# Patient Record
Sex: FEMALE | Race: WHITE
[De-identification: names, ages, dates, MRNs, and addresses within clinical notes are randomized per-mention and may not be internally consistent; named-entity substitution may affect disease eponyms.]

---

## 2017-01-03 ENCOUNTER — HOSPITAL ENCOUNTER (OUTPATIENT)
Dept: HOSPITAL 10 - SDS | Age: 37
Discharge: HOME | End: 2017-01-03
Attending: INTERNAL MEDICINE
Payer: COMMERCIAL

## 2017-01-03 VITALS — DIASTOLIC BLOOD PRESSURE: 68 MMHG | RESPIRATION RATE: 18 BRPM | HEART RATE: 69 BPM | SYSTOLIC BLOOD PRESSURE: 108 MMHG

## 2017-01-03 VITALS — SYSTOLIC BLOOD PRESSURE: 124 MMHG | HEART RATE: 88 BPM | DIASTOLIC BLOOD PRESSURE: 72 MMHG | RESPIRATION RATE: 16 BRPM

## 2017-01-03 VITALS
BODY MASS INDEX: 29.49 KG/M2 | BODY MASS INDEX: 29.49 KG/M2 | WEIGHT: 177.03 LBS | WEIGHT: 177.03 LBS | HEIGHT: 65 IN | HEIGHT: 65 IN

## 2017-01-03 VITALS — HEART RATE: 76 BPM | DIASTOLIC BLOOD PRESSURE: 75 MMHG | SYSTOLIC BLOOD PRESSURE: 117 MMHG | RESPIRATION RATE: 18 BRPM

## 2017-01-03 DIAGNOSIS — K29.50: Primary | ICD-10-CM

## 2017-01-03 PROCEDURE — 88305 TISSUE EXAM BY PATHOLOGIST: CPT

## 2017-01-03 PROCEDURE — 84703 CHORIONIC GONADOTROPIN ASSAY: CPT

## 2017-01-03 PROCEDURE — 88312 SPECIAL STAINS GROUP 1: CPT

## 2017-01-03 PROCEDURE — 43239 EGD BIOPSY SINGLE/MULTIPLE: CPT

## 2017-01-03 NOTE — GILP
DATE OF PROCEDURE:  

 

 

PROCEDURE PERFORMED:  EGD with biopsy.

 

INDICATION:  A 36-year-old female undergoing this procedure for persistent abdominal pain and heartb
urn.  The purpose is to evaluate upper GI tract and find out the cause of her symptoms.

 

INFORMED CONSENT:  The risk of the procedure, related and unrelated complications, anesthetic risks,
 alternatives discussed and informed consent was obtained.

 

DESCRIPTION OF PROCEDURE:  The patient was brought to the GI lab, sedated with 7 mg of Versed and 10
0 mg of fentanyl.  After optimal sedation, scope was passed with much ease into esophagus, which was
 grossly within normal limits.  Z line was at 37 cm, which was regular.  Stomach mucosa revealed mil
d gastritis.  Multiple biopsies obtained.  Duodenum, first and second part including ampulla, appear
ed normal.  Villi also were normal.  Retroversion done in the stomach.  No gross lesion was identifi
ed.  Scope was straightened out and removed with good patient tolerance.

 

IMPRESSION:

1.  Gastritis.

2.  Normal esophagus.

3.  Z line at 37 cm, which was normal.

4.  Normal duodenum.

 

PLAN:  Review histopathology.  We need to find out some other cause for her abdominal pain.

 

 

Dictated By: LESLIE ALVARADO/NERY

DD:    01/03/2017 11:11:01

DT:    01/03/2017 17:11:32

Conf#: [id number]

DID#:  [id number]

CC: LESLIE PEREZ MD;*EndCC*

## 2017-03-09 ENCOUNTER — HOSPITAL ENCOUNTER (EMERGENCY)
Dept: HOSPITAL 10 - FTE | Age: 37
Discharge: HOME | End: 2017-03-09
Payer: COMMERCIAL

## 2017-03-09 VITALS
RESPIRATION RATE: 18 BRPM | SYSTOLIC BLOOD PRESSURE: 112 MMHG | TEMPERATURE: 98.4 F | DIASTOLIC BLOOD PRESSURE: 62 MMHG | HEART RATE: 75 BPM

## 2017-03-09 VITALS — WEIGHT: 169.76 LBS | BODY MASS INDEX: 39.29 KG/M2 | HEIGHT: 55 IN

## 2017-03-09 DIAGNOSIS — R51: Primary | ICD-10-CM

## 2017-03-09 DIAGNOSIS — R11.0: ICD-10-CM

## 2017-03-09 LAB — URINE BLOOD (DIP) POC: (no result)

## 2017-03-09 PROCEDURE — 96372 THER/PROPH/DIAG INJ SC/IM: CPT

## 2017-03-09 PROCEDURE — 96374 THER/PROPH/DIAG INJ IV PUSH: CPT

## 2017-03-09 PROCEDURE — 81003 URINALYSIS AUTO W/O SCOPE: CPT

## 2017-03-09 PROCEDURE — 96375 TX/PRO/DX INJ NEW DRUG ADDON: CPT

## 2017-03-09 NOTE — ERD
ER Documentation


Chief Complaint


Date/Time


DATE: 3/9/17 


TIME: 09:58


Chief Complaint


HEADACHE X5 DAYS, HX OF MIGRAINE





HPI


This is a 37-year-old female who presents to the emergency department today 

complaining of a headache for the past several days.  Patient states she has a 

history of migraines.  States that she is to take amitriptyline from her doctor 

but changed over to Excedrin because amitriptyline was not working.  States she 

took Excedrin last night with no improvement.  Patient states she has some 

light sensitivity, noise sensitivity and some nausea.  Denies any fevers or 

chills, blurred vision





ROS


All systems reviewed and are negative except as per history of present illness.





Medications


Home Meds


Active Scripts


Naproxen* (Naprosyn*) 500 Mg Tablet, 500 MG PO BID Y for PAIN AND/OR 

INFLAMMATION, #30 TAB


   Prov:ZIA DOMINGUEZ PA-C         3/9/17


Acetamin/Butalbital/Caffeine* (Fioricet*) [id number] Tab, 1 TAB PO Q6H Y 

for PAIN, #30 TAB


   Prov:ZIA DOMINGUEZ PA-C         3/9/17


Reported Medications


Omeprazole* (Omeprazole*) 40 Mg Capsule.dr, 40 MG PO DAILY, #30 CAP


   1/3/17





Allergies


Allergies:  


Coded Allergies:  


     Penicillins (Verified  Allergy, Mild, 3/9/17)


     amoxicillin (Verified  Allergy, Mild, 3/9/17)





PMhx/Soc


History of Surgery:  Yes (breast implants and btl)


Anesthesia Reaction:  Yes (nausea and  vomiting)


Hx Neurological Disorder:  Yes (migrane )


Hx Respiratory Disorders:  No


Hx Cardiac Disorders:  No


Hx Psychiatric Problems:  No


Hx Miscellaneous Medical Probl:  No


Hx Alcohol Use:  Yes (socially)


Hx Substance Use:  No


Hx Tobacco Use:  No


Smoking Status:  Never smoker





Physical Exam


Vitals





Vital Signs








  Date Time  Temp Pulse Resp B/P Pulse Ox O2 Delivery O2 Flow Rate FiO2


 


3/9/17 08:30 97.8 67 18 138/81 100   








Physical Exam


Const:     Talkative, no acute distress


Head:   Atraumatic 


Eyes:    Normal Conjunctiva.  PERRLA.  EOM intact.


ENT:    Normal External Ears, Nose and Mouth.


Neck:               Full range of motion..~ No meningismus.


Resp:    Clear to auscultation bilaterally


Cardio:    Regular rate and rhythm, no murmurs


Abd:    Soft, non tender, non distended. Normal bowel sounds


Skin:    No petechiae or rashes


Neur:    Awake and alert.  Cranial nerves II through XII intact.  No gait 

ataxia.


Psych:    Normal Mood and Affect


Results 24 hrs





 Laboratory Tests








Test


  3/9/17


09:48


 


Bedside Urine Blood 3+ 


 


Bedside Urine Glucose (UA) Negative 


 


Bedside Urine Ketones (LAB) Negative 


 


Bedside Urine Leukocyte


Esterase (L Negative 


 


 


Bedside Urine Nitrite (LAB) Negative 


 


Bedside Urine Protein (LAB) Negative 


 


Bedside Urine pH (LAB) 6.0 








 Current Medications








 Medications


  (Trade)  Dose


 Ordered  Sig/Dameon


 Route


 PRN Reason  Start Time


 Stop Time Status Last Admin


Dose Admin


 


 Ketorolac


 Tromethamine


  (Toradol)  30 mg  ONCE  STAT


 IM


   3/9/17 09:33


 3/9/17 09:35 DC 3/9/17 09:45


 


 


 Ondansetron HCl


  (Zofran Odt)  4 mg  ONCE  STAT


 ODT


   3/9/17 09:33


 3/9/17 09:35 DC 3/9/17 09:45


 


 


 Diphenhydramine


 HCl


  (Benadryl)  12.5 mg  ONCE  ONCE


 IV


   3/9/17 10:30


 3/9/17 10:31 DC 3/9/17 10:36


 


 


 Metoclopramide


 HCl 10 mg  10 mg  ONCE  ONCE


 IV


   3/9/17 10:30


 3/9/17 10:31 DC 3/9/17 10:35


 


 


 Sodium Chloride


  (NS)  1,000 ml @ 


 1,000 mls/hr  Q1H ONCE


 IV


   3/9/17 10:30


 3/9/17 11:29 DC 3/9/17 10:35


 











Procedures/MDM


This is a 37-year-old female who presents to the emergency department today 

complaining of a headache for the past several days.  Patient has a history of 

migraines.  Given patient's history and the fact that she has no focal 

neurologic deficits and no gait ataxia I do not feel that she requires a head 

CT scan at this time.  Low suspicion for acute hemorrhage, mass, or abscess.





UA is negative for infection.  There is 3+ blood.  Patient indicated she was on 

her menstrual cycle currently.


Pregnancy test is negative.





Patient symptoms at this time is consistent with migraine headache.  Patient 

was given a Toradol injection as well as Zofran here in the emergency 

department however headache persisted.  I discussed the patient with Dr. Martínez 

he recommended giving her IV fluids, Benadryl and Reglan.  She reported feeling 

significantly better after this treatment.





Patient will be given a prescription for Naprosyn, Fioricet for home.  She has 

been instructed to follow back up with her primary care physician for possible 

referral to neurology.  I will give her a list of resources for neurology.





At this time the patient is stable for discharge and outpatient management. 

Patient should follow up with their PCP in the next 1-2 days.  They may return 

to the emergency department sooner for any persistent or worsening of symptoms.

  Patient understood and agreed with the plan.





Departure


Diagnosis:  


 Primary Impression:  


 Headache


 Headache type:  unspecified  Headache chronicity pattern:  unspecified pattern

  Intractability:  not intractable  Qualified Code:  R51 - Nonintractable 

headache, unspecified chronicity pattern, unspecified headache type


Condition:  ZIA Brewer PA-C Mar 9, 2017 10:00

## 2017-05-08 ENCOUNTER — HOSPITAL ENCOUNTER (EMERGENCY)
Dept: HOSPITAL 10 - FTE | Age: 37
Discharge: HOME | End: 2017-05-08
Payer: COMMERCIAL

## 2017-05-08 VITALS
BODY MASS INDEX: 29.69 KG/M2 | BODY MASS INDEX: 29.69 KG/M2 | WEIGHT: 167.55 LBS | WEIGHT: 167.55 LBS | HEIGHT: 63 IN | HEIGHT: 63 IN

## 2017-05-08 DIAGNOSIS — R11.10: ICD-10-CM
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
## 2017-05-08 DIAGNOSIS — J02.9: ICD-10-CM

## 2017-05-08 DIAGNOSIS — R51: ICD-10-CM

## 2017-05-08 DIAGNOSIS — S40.862A: ICD-10-CM

## 2017-05-08 DIAGNOSIS — R50.9: Primary | ICD-10-CM

## 2017-05-08 PROCEDURE — 99284 EMERGENCY DEPT VISIT MOD MDM: CPT

## 2017-05-08 NOTE — ERD
ER Documentation


Chief Complaint


Date/Time


DATE: 5/8/17 


TIME: 07:35


Chief Complaint


fever , vomiting , sore throat





HPI


This a 37-year-old female who presents the emergency department today 

complaining of sore throat,, headache vomiting, and itchiness on her skin from 

bug bites that started last night.  Patient states that she was in [place] 

[place] and went back to her home country of [facility] for the last week.  

States that she has gone to the bathroom multiple times but denies any 

diarrhea.  States she has some crampy abdominal pain.  States she felt chilled 

but denies any fevers, joint pain.  Denies any dysuria.





ROS


All systems reviewed and are negative except as per history of present illness.





Medications


Home Meds


Active Scripts


Ciprofloxacin Hcl* (Ciprofloxacin Hcl*) 500 Mg Tablet, 500 MG PO BID for 3 Days

, TAB


   Prov:ZIA DOMINGUEZ PA-C         5/8/17


Cetirizine Hcl* (Zyrtec*) 10 Mg Capsule, 10 MG PO DAILY, #10 TAB.CHEW


   Prov:ZIA DOMINGUEZ PA-C         5/8/17


Acetaminophen* (Tylophen*) 500 Mg Capsule, 1 CAP PO Q6H Y for PAIN AND OR 

ELEVATED TEMP, #30 CAP


   Prov:ZIA DOMINGUEZ PA-C         5/8/17


Ibuprofen* (Motrin*) 600 Mg Tab, 600 MG PO Q6, #30 TAB


   Prov:ZIA DOMINGUEZ PA-C         5/8/17


Electrolyte,Oral (Pedialyte) 1,000 Ml Solution, 100 ML PO Q6 Y for vomiting, #

1000 ML


   Prov:ZIA DOMINGUEZ PA-C         5/8/17


Hydrocortisone* Topical (Hydrocortisone* Topical) 1%-28.35 Gm Cream..g., 1 

APPLIC TOP Q6 Y for ITCHING, #1 TUB


   Prov:ZIA DOMINGUEZ PA-C         5/8/17


Ondansetron Hcl* (Zofran*) 4 Mg Tablet, 4 MG PO Q6H for NAUSEA AND/OR VOMITING, 

#30 TAB


   Prov:ZIA DOMINGUEZ PA-C         5/8/17


Diphenhydramine Hcl* (Benadryl*) 25 Mg Cap, 25 MG PO Q6, #30 CAP


   Prov:ZIA DOMINGUEZ PA-C         5/8/17


Naproxen* (Naprosyn*) 500 Mg Tablet, 500 MG PO BID Y for PAIN AND/OR 

INFLAMMATION, #30 TAB


   Prov:ZIA DOMINGUEZ PA-C         3/9/17


Acetamin/Butalbital/Caffeine* (Fioricet*) [id number] Tab, 1 TAB PO Q6H Y 

for PAIN, #30 TAB


   Prov:ZIA DOMINGUEZ PA-C         3/9/17


Reported Medications


Omeprazole* (Omeprazole*) 40 Mg Capsule.dr, 40 MG PO DAILY, #30 CAP


   1/3/17





Allergies


Allergies:  


Coded Allergies:  


     Penicillins (Verified  Allergy, Mild, 3/9/17)


     amoxicillin (Verified  Allergy, Mild, 3/9/17)





PMhx/Soc


History of Surgery:  Yes (breast implants and btl)


Anesthesia Reaction:  Yes (nausea and  vomiting)


Hx Neurological Disorder:  Yes (migrane )


Hx Respiratory Disorders:  No


Hx Cardiac Disorders:  No


Hx Psychiatric Problems:  No


Hx Miscellaneous Medical Probl:  No


Hx Alcohol Use:  Yes (socially)


Hx Substance Use:  No


Hx Tobacco Use:  No





Physical Exam


Vitals





Vital Signs








  Date Time  Temp Pulse Resp B/P Pulse Ox O2 Delivery O2 Flow Rate FiO2


 


5/8/17 06:57 98.1 79 16 127/77 98   








Physical Exam


Const:    Nontoxic-appearing


Head:   Atraumatic 


Eyes:    Normal Conjunctiva


ENT:    Ears TMs normal.  Nose no drainage.  Throat no erythema no exudate.  

Drainage posterior pharynx peer


Neck:               Full range of motion..~ No meningismus.


Resp:    Clear to auscultation bilaterally


Cardio:    Regular rate and rhythm, no murmurs


Abd:    Soft, crampy periumbilical tenderness non distended. Normal bowel 

sounds no right lower quadrant pain.  No tenderness McBurney's.  No left lower 

quadrant pain.


Skin:    No petechiae or rashes.  Multiple small bug bite on left arm


Neur:    Awake and alert


Psych:    Normal Mood and Affect


Results 24 hrs





 Current Medications








 Medications


  (Trade)  Dose


 Ordered  Sig/Dameon


 Route


 PRN Reason  Start Time


 Stop Time Status Last Admin


Dose Admin


 


 Ondansetron HCl


  (Zofran Odt)  4 mg  ONCE  STAT


 ODT


   5/8/17 07:22


 5/8/17 07:24 DC 5/8/17 07:26


 


 


 Acetaminophen


  (Tylenol Tab)  500 mg  ONCE  STAT


 PO


   5/8/17 07:26


 5/8/17 07:27 DC 5/8/17 07:28


 











Procedures/MDM


This a 37-year-old female who presents to the emergency department today with 

multiple complaints.  Patient stated that she turned from [facility] last 

night when she started noticing the symptoms.  On physical exam patient appears 

to have more crampy abdominal pain however she has no focal tenderness in the 

right lower quadrant and no tenderness McBurney's.  She has no left lower 

quadrant pain.  Low suspicion for acute surgical abdomen.  Patient is not 

actively vomiting however I will give her Zofran and a p.o. challenge here in 

the emergency department.





 Patient's ENT exam is benign with the exception that the patient has 

laryngitis.  I have seen this patient in the past and she did follow-up with 

her doctor in regards to her laryngitis but was never referred.  Patient does 

have some drainage in her posterior pharynx and it may be secondary to allergic 

rhinitis however I have given patient a referral information for ENT specialist 

to further evaluate the laryngitis.  Patient denies any difficulty swallowing.





Patient also has multiple insect bites that were noticed on her left arm.  

Patient is afebrile and otherwise well-appearing.  She has no rash or joint 

pain.  She has no evidence of conjunctivitis and have low suspicion that 

patient has contracted Zika virus of [place] or dengue.  





Patient was given Zofran, Tylenol and a p.o. challenge here in the emergency 

department.  Patient was not actively vomiting.  Prior to discharge patient was 

complaining of a headache.  





Patient was given a prescription for Zyrtec, Motrin, Pedialyte, Zofran, 

Tylenol.  Given that patient has recently traveled out of the country I did 

give the patient a prescription for Cipro and explained to her that she should 

only take the Cipro if she develops diarrhea.  Patient was also given a 

prescription for Benadryl and hydrocortisone cream for her insect bites.  I did 

not give patient Benadryl here in the emergency department as she was driving 

herself





At this time the patient is stable for discharge and outpatient management. 

Patient should follow up with their PCP in the next 1-2 days.  They may return 

to the emergency department sooner for any persistent or worsening of symptoms.

  Patient understood and agreed with the plan.





Departure


Diagnosis:  


 Primary Impression:  


 Multiple complaints


Condition:  ZIA Brewer PA-C May 8, 2017 07:42

## 2017-12-16 ENCOUNTER — HOSPITAL ENCOUNTER (EMERGENCY)
Dept: HOSPITAL 10 - FTE | Age: 37
Discharge: HOME | End: 2017-12-16
Payer: COMMERCIAL

## 2017-12-16 VITALS
BODY MASS INDEX: 28.6 KG/M2 | WEIGHT: 167.55 LBS | BODY MASS INDEX: 28.6 KG/M2 | WEIGHT: 167.55 LBS | HEIGHT: 64 IN | HEIGHT: 64 IN

## 2017-12-16 DIAGNOSIS — J32.9: ICD-10-CM

## 2017-12-16 DIAGNOSIS — J20.9: Primary | ICD-10-CM

## 2017-12-16 DIAGNOSIS — G43.909: ICD-10-CM

## 2017-12-16 PROCEDURE — 81003 URINALYSIS AUTO W/O SCOPE: CPT

## 2017-12-16 PROCEDURE — 96374 THER/PROPH/DIAG INJ IV PUSH: CPT

## 2017-12-16 PROCEDURE — 96375 TX/PRO/DX INJ NEW DRUG ADDON: CPT

## 2018-01-18 ENCOUNTER — HOSPITAL ENCOUNTER (OUTPATIENT)
Age: 38
Discharge: HOME | End: 2018-01-18

## 2018-01-18 ENCOUNTER — HOSPITAL ENCOUNTER (OUTPATIENT)
Dept: HOSPITAL 91 - SDS | Age: 38
Discharge: HOME | End: 2018-01-18
Payer: COMMERCIAL

## 2018-01-18 DIAGNOSIS — M67.431: Primary | ICD-10-CM

## 2018-01-18 PROCEDURE — 25111 REMOVE WRIST TENDON LESION: CPT

## 2018-01-18 PROCEDURE — 88304 TISSUE EXAM BY PATHOLOGIST: CPT

## 2018-01-18 RX ADMIN — METOCLOPRAMIDE HYDROCHLORIDE 1 MG: 10 INJECTION, SOLUTION INTRAMUSCULAR; INTRAVENOUS at 18:47

## 2018-01-18 RX ADMIN — ONDANSETRON HYDROCHLORIDE 1 MG: 2 INJECTION, SOLUTION INTRAMUSCULAR; INTRAVENOUS at 17:47

## 2018-01-18 RX ADMIN — MEPERIDINE HYDROCHLORIDE 1 MG: 25 INJECTION, SOLUTION INTRAMUSCULAR; INTRAVENOUS; SUBCUTANEOUS at 17:48

## 2018-01-18 RX ADMIN — BUPIVACAINE HYDROCHLORIDE 1 ML: 5 INJECTION, SOLUTION EPIDURAL; INTRACAUDAL; PERINEURAL at 16:48

## 2018-11-09 ENCOUNTER — HOSPITAL ENCOUNTER (OUTPATIENT)
Age: 38
Discharge: HOME | End: 2018-11-09

## 2018-11-09 ENCOUNTER — HOSPITAL ENCOUNTER (OUTPATIENT)
Dept: HOSPITAL 91 - SDS | Age: 38
Discharge: HOME | End: 2018-11-09
Payer: COMMERCIAL

## 2018-11-09 DIAGNOSIS — D17.72: Primary | ICD-10-CM

## 2018-11-09 PROCEDURE — 84703 CHORIONIC GONADOTROPIN ASSAY: CPT

## 2018-11-09 PROCEDURE — 11426 EXC H-F-NK-SP B9+MARG >4 CM: CPT

## 2018-11-09 PROCEDURE — 88307 TISSUE EXAM BY PATHOLOGIST: CPT

## 2018-11-09 RX ADMIN — BUPIVACAINE HYDROCHLORIDE AND EPINEPHRINE BITARTRATE 1 ML: 5; .005 INJECTION, SOLUTION EPIDURAL; INTRACAUDAL; PERINEURAL at 10:50

## 2018-11-09 RX ADMIN — FENTANYL CITRATE 1 MCG: 50 INJECTION, SOLUTION INTRAMUSCULAR; INTRAVENOUS at 11:59

## 2018-11-09 RX ADMIN — ONDANSETRON HYDROCHLORIDE 1 MG: 2 INJECTION, SOLUTION INTRAMUSCULAR; INTRAVENOUS at 11:58

## 2018-11-09 RX ADMIN — ONDANSETRON HYDROCHLORIDE 1 MG: 2 INJECTION, SOLUTION INTRAMUSCULAR; INTRAVENOUS at 13:17

## 2018-11-09 RX ADMIN — HYDROMORPHONE HYDROCHLORIDE 1 MG: 2 INJECTION INTRAMUSCULAR; INTRAVENOUS; SUBCUTANEOUS at 13:13

## 2018-11-17 ENCOUNTER — HOSPITAL ENCOUNTER (EMERGENCY)
Dept: HOSPITAL 91 - FTE | Age: 38
Discharge: HOME | End: 2018-11-17
Payer: COMMERCIAL

## 2018-11-17 ENCOUNTER — HOSPITAL ENCOUNTER (EMERGENCY)
Age: 38
Discharge: HOME | End: 2018-11-17

## 2018-11-17 DIAGNOSIS — F17.210: ICD-10-CM
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
## 2018-11-17 DIAGNOSIS — S01.21XA: Primary | ICD-10-CM
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
## 2018-11-17 DIAGNOSIS — Z23: ICD-10-CM

## 2018-11-17 PROCEDURE — 70486 CT MAXILLOFACIAL W/O DYE: CPT

## 2018-11-17 PROCEDURE — 12011 RPR F/E/E/N/L/M 2.5 CM/<: CPT

## 2018-11-17 PROCEDURE — 90471 IMMUNIZATION ADMIN: CPT

## 2018-11-17 PROCEDURE — 99284 EMERGENCY DEPT VISIT MOD MDM: CPT

## 2018-11-17 PROCEDURE — 90715 TDAP VACCINE 7 YRS/> IM: CPT

## 2018-11-17 RX ADMIN — LIDOCAINE HYDROCHLORIDE 1 ML: 10 INJECTION, SOLUTION EPIDURAL; INFILTRATION; INTRACAUDAL; PERINEURAL at 22:16

## 2018-11-17 RX ADMIN — LIDOCAINE HYDROCHLORIDE 1 ML: 10 INJECTION, SOLUTION INFILTRATION; PERINEURAL at 21:59

## 2018-11-17 RX ADMIN — CLOSTRIDIUM TETANI TOXOID ANTIGEN (FORMALDEHYDE INACTIVATED), CORYNEBACTERIUM DIPHTHERIAE TOXOID ANTIGEN (FORMALDEHYDE INACTIVATED), BORDETELLA PERTUSSIS TOXOID ANTIGEN (GLUTARALDEHYDE INACTIVATED), BORDETELLA PERTUSSIS FILAMENTOUS HEMAGGLUTININ ANTIGEN (FORMALDEHYDE INACTIVATED), BORDETELLA PERTUSSIS PERTACTIN ANTIGEN, AND BORDETELLA PERTUSSIS FIMBRIAE 2/3 ANTIGEN 1 ML: 5; 2; 2.5; 5; 3; 5 INJECTION, SUSPENSION INTRAMUSCULAR at 21:59

## 2018-11-17 RX ADMIN — IBUPROFEN 1 MG: 600 TABLET ORAL at 22:11

## 2019-04-06 ENCOUNTER — HOSPITAL ENCOUNTER (EMERGENCY)
Dept: HOSPITAL 91 - FTE | Age: 39
LOS: 1 days | Discharge: HOME | End: 2019-04-07
Payer: COMMERCIAL

## 2019-04-06 ENCOUNTER — HOSPITAL ENCOUNTER (EMERGENCY)
Dept: HOSPITAL 10 - FTE | Age: 39
LOS: 1 days | Discharge: HOME | End: 2019-04-07
Payer: COMMERCIAL

## 2019-04-06 VITALS
HEIGHT: 65 IN | WEIGHT: 179.46 LBS | BODY MASS INDEX: 29.9 KG/M2 | WEIGHT: 179.46 LBS | BODY MASS INDEX: 29.9 KG/M2 | HEIGHT: 65 IN

## 2019-04-06 DIAGNOSIS — G43.909: Primary | ICD-10-CM

## 2019-04-06 DIAGNOSIS — Z87.891: ICD-10-CM

## 2019-04-06 PROCEDURE — 96361 HYDRATE IV INFUSION ADD-ON: CPT

## 2019-04-06 PROCEDURE — 96374 THER/PROPH/DIAG INJ IV PUSH: CPT

## 2019-04-06 PROCEDURE — 96375 TX/PRO/DX INJ NEW DRUG ADDON: CPT

## 2019-04-06 PROCEDURE — 99284 EMERGENCY DEPT VISIT MOD MDM: CPT

## 2019-04-06 RX ADMIN — ONDANSETRON HYDROCHLORIDE 1 MG: 2 INJECTION, SOLUTION INTRAMUSCULAR; INTRAVENOUS at 23:44

## 2019-04-06 RX ADMIN — THIAMINE HYDROCHLORIDE 1 MLS/HR: 100 INJECTION, SOLUTION INTRAMUSCULAR; INTRAVENOUS at 23:46

## 2019-04-06 RX ADMIN — KETOROLAC TROMETHAMINE 1 MG: 30 INJECTION, SOLUTION INTRAMUSCULAR at 23:45

## 2019-04-06 RX ADMIN — DIPHENHYDRAMINE HYDROCHLORIDE 1 MG: 50 INJECTION, SOLUTION INTRAMUSCULAR; INTRAVENOUS at 23:44

## 2019-04-06 RX ADMIN — METOCLOPRAMIDE HYDROCHLORIDE 1 MG: 10 INJECTION, SOLUTION INTRAMUSCULAR; INTRAVENOUS at 23:44

## 2019-04-07 VITALS — HEART RATE: 65 BPM | RESPIRATION RATE: 18 BRPM | SYSTOLIC BLOOD PRESSURE: 107 MMHG | DIASTOLIC BLOOD PRESSURE: 63 MMHG

## 2019-04-07 NOTE — ERD
ER Documentation


Chief Complaint


Chief Complaint





states migraine headache  x 1 week,  worse  today





HPI


This is a 39-year-old female with past medical history of migraine headaches 


presents to the ED for progressively worsening headache times 1 week.  Patient 


states headache is pulsating in nature and localized to her right parietal 


scalp.  She reports associated phonophobia and photophobia.  Denies any nausea, 


vomiting, focal weakness, numbness, tingling or any other symptoms.  Patient 


states her last migraine headache was 7 months ago and today symptoms are 


similar.  No known exacerbating or relieving factors.





ROS


All systems reviewed and are negative except as per history of present illness.





Medications


Home Meds


Active Scripts


Ibuprofen* (Motrin*) 400 Mg Tab, 400 MG PO Q6H PRN for PAIN AND OR ELEVATED 


TEMP, #30 TAB


   Prov:DAPHNIE BERGER PA-C         4/7/19


Ibuprofen* (Motrin*) 600 Mg Tab, 600 MG PO Q6, #30 TAB


   Prov:PACO GUZMÁN PA-C         11/17/18


Reported Medications


Omeprazole* (Omeprazole*) 40 Mg Capsule.dr, 40 MG PO DAILY, #30 CAP


   1/17/18





Allergies


Allergies:  


Coded Allergies:  


     Penicillins (Verified  Allergy, Mild, 11/17/18)


     amoxicillin (Verified  Allergy, Mild, 11/17/18)


     acetaminophen (Verified  Allergy, Unknown, nausea, dizziness,sob, 11/17/18)


     hydrocodone (Verified  Allergy, Unknown, nausea, dizziness,sob, 11/17/18)





PMhx/Soc


History of Surgery:  Yes (breast implant, lt hand mass removal ,left groin cyst 


removed)


Anesthesia Reaction:  No


Hx Neurological Disorder:  No


Hx Respiratory Disorders:  No


Hx Cardiac Disorders:  No


Hx Psychiatric Problems:  No


Hx Miscellaneous Medical Probl:  No


Hx Alcohol Use:  Yes (socially)


Hx Substance Use:  No


Hx Tobacco Use:  Yes


Smoking Status:  Never smoker





Physical Exam


Vitals





Vital Signs


  Date      Temp  Pulse  Resp  B/P (MAP)   Pulse Ox  O2          O2 Flow    FiO2


Time                                                 Delivery    Rate


    4/6/19  97.2     68    18      125/69       100


     21:07                           (87)





Physical Exam


Const:   No acute distress


Head:   Atraumatic 


Eyes:    Normal Conjunctiva


ENT:    Normal External Ears, Nose and Mouth.


Neck:               Full range of motion. No meningismus.


Resp:   Clear to auscultation bilaterally


Cardio:   Regular rate and rhythm, no murmurs


Abd:    Soft, non tender, non distended. Normal bowel sounds


Skin:   No petechiae or rashes


Back:   No midline or flank tenderness


Ext:    No cyanosis, or edema


 Neuro:


 M/S:   Alert and oriented


 Face:   EOMI, face and pharynx with normal sensation and function


 Motor:    Normal strength throughout


 Sensation:    Normal sensation throughout


 Speech:   Normal


 Cerebel:   Normal coordination


      Normal gait


      Normal finger to nose


 DTR:   2+ and symmetric upper/lower extremities


Psych:    Normal Mood and Affect


Results 24 hrs





Current Medications


 Medications
   Dose
          Sig/Dameon
       Start Time
   Status  Last


 (Trade)       Ordered        Route
 PRN     Stop Time              Admin
Dose


                              Reason                                Admin


 Sodium         1,000 ml @ 
   Q1H STAT
      4/6/19        DC            4/6/19


Chloride       1,000 mls/hr   IV
            23:31
 4/7/19                23:46



                                             00:30


                10 mg          ONCE  STAT
    4/6/19        DC            4/6/19


Metoclopramid                 IV
            23:31
 4/6/19                23:44



e HCl
                                       23:33


(Reglan)


 Ondansetron    4 mg           ONCE  STAT
    4/6/19        DC            4/6/19


HCl
  (Zofran                 IV
            23:31
 4/6/19                23:44



Inj)                                         23:33


 Ketorolac
     15 mg          ONCE  STAT
    4/6/19        DC            4/6/19


Tromethamine
                 IV
            23:31
 4/6/19                23:45



 (Toradol)                                   23:33


                25 mg          ONCE  STAT
    4/6/19        DC            4/6/19


Diphenhydrami                 IV
            23:31
 4/6/19                23:44



ne
 HCl
                                     23:33


(Benadryl)








Procedures/MDM


ED course:    Patient treated with IV fluids, IV Toradol, Benadryl and Reglan 


with improvement of her symptoms.





MDM:    This is a 39-year-old female with history of migraine headaches presents


to the ED with exacerbation of her previous migraine headaches.  History and 


physical most consistent with primary headache.  Vital signs are stable. No 


focal neurological deficits on physical exam.  Clinical presentation not 


consistent with subarachnoid hemorrhage, epidural or subdural hematoma, IC mass,


CVA, encephalitis or meningitis.  Symptoms improved status post IV fluids, 


Toradol, Benadryl and Reglan as above.  Patient was discharge home with close 


follow up and mangement by PCP in 48 hours. Strict return precautions dicussed.





Prescriptions:    Ibuprofen.





Departure


Diagnosis:  


   Primary Impression:  


   Migraine


   Migraine type:  unspecified  Status migrainosus presence:  without status 


   migrainosus  Intractability:  not intractable  Qualified Codes:  G43.909 - 


   Migraine, unspecified, not intractable, without status migrainosus


Condition:  Stable


Patient Instructions:  Headache, Migraine (Classical)


Referrals:  


[facility] (PCP)





Additional Instructions:  


Please follow-up with your regular doctor for further management of your 


migraine headaches.  He may need stronger medications to help with these 


headaches.  Stay away from any stressful activities that may be causing her 


headaches.  Return here for any new or worsening symptoms.











DAPHNIE BERGER PA-C      Apr 7, 2019 01:04

## 2019-06-12 NOTE — PREOPHP
DATE OF ADMISSION: 06/13/2019

 

HISTORY OF PRESENT ILLNESS:  A 39-year-old female patient with a long history of nasal obstruction to
 breathing.  She sustained a nasal fracture in 11/2018.  She presents to the office with evidence of 
an old nasal fracture with septal deviation.  She is now admitted to the hospital for corrective nasa
l surgery.  The patient underwent a CAT scan of the sinuses to prior surgery which was unremarkable.

 

PAST MEDICAL HISTORY:  

 

ALLERGIES:  AMOXICILLIN.

 

MEDICAL CONDITIONS:  None.

 

DAILY MEDICATIONS:

1.  Omeprazole.

2.  Tylenol.

3.  Advil.

 

PRIOR SURGERY:  Breast implants.

 

CLOTTING DISORDERS:  None.

 

HABITS:  Alcohol:  Social.  Tobacco:  Social.  Recreational drugs:  None.

 

PHYSICAL EXAMINATION:

GENERAL:  Well-developed, well-nourished female patient in no acute distress.

HEAD:  Normocephalic.  No masses or deformities.

EARS AND TYMPANIC MEMBRANES:  There is evidence of old nasal fracture with septal deviation and turbi
samina hypertrophy.  Oropharynx is clear.

NECK:  No masses or adenopathy.

CHEST:  Clear to P and A.

HEART:  Regular sinus rhythm without murmur.

ABDOMEN:  Soft.  Bowel sounds are normal.  No masses or megaly.

EXTREMITIES:  Full range of motion without deformity.

NEUROLOGIC:  Physiologic.

PELVIC AND RECTAL:  Not done.

 

IMPRESSION:

1.  Septal deviation.

2.  Turbinate hypertrophy.

 

RECOMMENDATIONS:  Admit for surgery to include septoplasty and turbinate reduction.

 

 

Dictated By: ALFREDO THOMAS/NTS

DD:    06/12/2019 18:02:25

DT:    06/12/2019 20:46:43

Conf#: [id number]

DID#:  [id number]

## 2019-06-13 ENCOUNTER — HOSPITAL ENCOUNTER (OUTPATIENT)
Dept: HOSPITAL 91 - SDS | Age: 39
Discharge: HOME | End: 2019-06-13
Payer: COMMERCIAL

## 2019-06-13 ENCOUNTER — HOSPITAL ENCOUNTER (OUTPATIENT)
Dept: HOSPITAL 10 - SDS | Age: 39
Discharge: HOME | End: 2019-06-13
Attending: OTOLARYNGOLOGY
Payer: COMMERCIAL

## 2019-06-13 VITALS — HEART RATE: 80 BPM | RESPIRATION RATE: 23 BRPM | DIASTOLIC BLOOD PRESSURE: 63 MMHG | SYSTOLIC BLOOD PRESSURE: 121 MMHG

## 2019-06-13 VITALS — DIASTOLIC BLOOD PRESSURE: 68 MMHG | HEART RATE: 86 BPM | SYSTOLIC BLOOD PRESSURE: 116 MMHG | RESPIRATION RATE: 17 BRPM

## 2019-06-13 VITALS
HEIGHT: 65 IN | HEIGHT: 65 IN | BODY MASS INDEX: 30.12 KG/M2 | BODY MASS INDEX: 30.12 KG/M2 | WEIGHT: 180.78 LBS | WEIGHT: 180.78 LBS

## 2019-06-13 VITALS — HEART RATE: 64 BPM | SYSTOLIC BLOOD PRESSURE: 116 MMHG | RESPIRATION RATE: 10 BRPM | DIASTOLIC BLOOD PRESSURE: 70 MMHG

## 2019-06-13 VITALS — DIASTOLIC BLOOD PRESSURE: 70 MMHG | RESPIRATION RATE: 16 BRPM | HEART RATE: 84 BPM | SYSTOLIC BLOOD PRESSURE: 118 MMHG

## 2019-06-13 VITALS — DIASTOLIC BLOOD PRESSURE: 73 MMHG | HEART RATE: 90 BPM | RESPIRATION RATE: 18 BRPM | SYSTOLIC BLOOD PRESSURE: 120 MMHG

## 2019-06-13 VITALS — SYSTOLIC BLOOD PRESSURE: 112 MMHG | HEART RATE: 78 BPM | RESPIRATION RATE: 18 BRPM | DIASTOLIC BLOOD PRESSURE: 75 MMHG

## 2019-06-13 VITALS — DIASTOLIC BLOOD PRESSURE: 71 MMHG | HEART RATE: 64 BPM | RESPIRATION RATE: 10 BRPM | SYSTOLIC BLOOD PRESSURE: 113 MMHG

## 2019-06-13 VITALS — DIASTOLIC BLOOD PRESSURE: 71 MMHG | SYSTOLIC BLOOD PRESSURE: 117 MMHG | HEART RATE: 72 BPM | RESPIRATION RATE: 18 BRPM

## 2019-06-13 VITALS — RESPIRATION RATE: 12 BRPM | HEART RATE: 66 BPM | SYSTOLIC BLOOD PRESSURE: 117 MMHG | DIASTOLIC BLOOD PRESSURE: 72 MMHG

## 2019-06-13 VITALS — RESPIRATION RATE: 15 BRPM | DIASTOLIC BLOOD PRESSURE: 79 MMHG | SYSTOLIC BLOOD PRESSURE: 117 MMHG | HEART RATE: 68 BPM

## 2019-06-13 VITALS — RESPIRATION RATE: 16 BRPM | HEART RATE: 63 BPM | DIASTOLIC BLOOD PRESSURE: 76 MMHG | SYSTOLIC BLOOD PRESSURE: 122 MMHG

## 2019-06-13 VITALS — HEART RATE: 72 BPM | RESPIRATION RATE: 12 BRPM | DIASTOLIC BLOOD PRESSURE: 79 MMHG | SYSTOLIC BLOOD PRESSURE: 119 MMHG

## 2019-06-13 VITALS — SYSTOLIC BLOOD PRESSURE: 111 MMHG | DIASTOLIC BLOOD PRESSURE: 76 MMHG | HEART RATE: 68 BPM | RESPIRATION RATE: 15 BRPM

## 2019-06-13 VITALS — HEART RATE: 67 BPM | DIASTOLIC BLOOD PRESSURE: 77 MMHG | SYSTOLIC BLOOD PRESSURE: 120 MMHG | RESPIRATION RATE: 18 BRPM

## 2019-06-13 VITALS — HEART RATE: 68 BPM | RESPIRATION RATE: 10 BRPM | DIASTOLIC BLOOD PRESSURE: 70 MMHG | SYSTOLIC BLOOD PRESSURE: 119 MMHG

## 2019-06-13 VITALS — SYSTOLIC BLOOD PRESSURE: 121 MMHG | RESPIRATION RATE: 15 BRPM | DIASTOLIC BLOOD PRESSURE: 74 MMHG

## 2019-06-13 VITALS — HEART RATE: 64 BPM | RESPIRATION RATE: 10 BRPM | SYSTOLIC BLOOD PRESSURE: 111 MMHG | DIASTOLIC BLOOD PRESSURE: 72 MMHG

## 2019-06-13 DIAGNOSIS — J34.2: Primary | ICD-10-CM

## 2019-06-13 DIAGNOSIS — J34.3: ICD-10-CM

## 2019-06-13 PROCEDURE — 30140 RESECT INFERIOR TURBINATE: CPT

## 2019-06-13 PROCEDURE — 88300 SURGICAL PATH GROSS: CPT

## 2019-06-13 PROCEDURE — 30520 REPAIR OF NASAL SEPTUM: CPT

## 2019-06-13 RX ADMIN — SODIUM CHLORIDE PRN MCG: 9 INJECTION, SOLUTION INTRAVENOUS at 11:51

## 2019-06-13 RX ADMIN — ONDANSETRON HYDROCHLORIDE 1 MG: 2 INJECTION, SOLUTION INTRAMUSCULAR; INTRAVENOUS at 12:43

## 2019-06-13 RX ADMIN — LIDOCAINE HYDROCHLORIDE,EPINEPHRINE BITARTRATE 1 ML: 10; .01 INJECTION, SOLUTION INFILTRATION; PERINEURAL at 11:00

## 2019-06-13 RX ADMIN — SODIUM CHLORIDE PRN MCG: 9 INJECTION, SOLUTION INTRAVENOUS at 12:13

## 2019-06-13 RX ADMIN — COCAINE HYDROCHLORIDE 1 ML: 40 SOLUTION NASAL at 11:00

## 2019-06-13 RX ADMIN — METOCLOPRAMIDE HYDROCHLORIDE 1 MG: 10 INJECTION, SOLUTION INTRAMUSCULAR; INTRAVENOUS at 11:51

## 2019-06-13 RX ADMIN — FENTANYL CITRATE 1 MCG: 50 INJECTION, SOLUTION INTRAMUSCULAR; INTRAVENOUS at 12:13

## 2019-06-13 RX ADMIN — FENTANYL CITRATE 1 MCG: 50 INJECTION, SOLUTION INTRAMUSCULAR; INTRAVENOUS at 11:51

## 2019-06-13 RX ADMIN — MEPERIDINE HYDROCHLORIDE 1 MG: 25 INJECTION, SOLUTION INTRAMUSCULAR; INTRAVENOUS; SUBCUTANEOUS at 12:40

## 2019-06-13 RX ADMIN — LIDOCAINE HYDROCHLORIDE 1 ML: 10; .005 INJECTION, SOLUTION EPIDURAL; INFILTRATION; INTRACAUDAL; PERINEURAL at 11:00

## 2019-06-13 NOTE — PAC
Date/Time of Note


Date/Time of Note


DATE: 6/13/19 


TIME: 11:33





Post-Anesthesia Notes


Post-Anesthesia Note


Last documented vital signs





Vital Signs


  Date      Temp  Pulse  Resp  B/P (MAP)   Pulse Ox  O2          O2 Flow    FiO2


Time                                                 Delivery    Rate


   6/13/19  97.2     63    16      122/76       100  Room Air


      1133                           (91)





Activity:  WNL


Respiratory function:  WNL


Cardiovascular function:  WNL


Mental status:  Baseline


Pain reasonably controlled:  Yes


Hydration appropriate:  Yes


Nausea/Vomiting absent:  Yes











CHUYITA ABDI                 Jun 13, 2019 11:34

## 2019-06-13 NOTE — PREAC
Date/Time of Note


Date/Time of Note


DATE: 6/13/19 


TIME: 10:05





Anesthesia Eval and Record


Evaluation


Time Pre-Procedure Interview


DATE: 6/13/19 


TIME: 10:05


Age


39


Sex


female


NPO:  8 hrs


Preoperative diagnosis


deviated septum


Planned procedure


septoplasty, turbinate reduction





Past Medical History


Past Medical History:  Includes


Musculoskeletal:  Other (migraines)


GI:  GERD, Obesity


Heme:  Anemia





Surgery & Anesthesia Issues


No known issue





Meds


Anticoagulation:  No


Beta Blocker within 24 hr:  No


Reason Beta Blocker not given:  Pt. not on B-Blocker


Reported Medications


Omeprazole* (Omeprazole*) 40 Mg Capsule.dr, 40 MG PO DAILY, #30 CAP


   6/13/19


Discontinued Reported Medications


Omeprazole* (Omeprazole*) 40 Mg Capsule.dr, 40 MG PO DAILY, #30 CAP


   1/17/18


Discontinued Scripts


Ibuprofen* (Motrin*) 400 Mg Tab, 400 MG PO Q6H PRN for PAIN AND OR ELEVATED 


TEMP, #30 TAB


   Prov:DAPHNIE BERGER PA-C         4/7/19


Ibuprofen* (Motrin*) 600 Mg Tab, 600 MG PO Q6, #30 TAB


   Prov:PACO GUZMÁN PA-C         11/17/18





Current Medications


Lactated Ringer's 1,000 ml @  25 mls/hr Q24H IV ;  Start 6/13/19 at 10:00


Meds reviewed:  Yes





Allergies


Coded Allergies:  


     Penicillins (Verified  Allergy, Mild, 6/13/19)


     amoxicillin (Verified  Allergy, Mild, 6/13/19)


     acetaminophen (Verified  Allergy, Unknown, nausea, dizziness,sob, 6/13/19)


     hydrocodone (Verified  Allergy, Unknown, nausea, dizziness,sob, 6/13/19)


Allergies Reviewed:  Yes





Labs/Studies


Labs Reviewed:  Reviewed by anesthesiologist


Pregnancy test:  Negative





Pre-procedure Exam


Last vitals





Vital Signs


  Date      Temp  Pulse  Resp  B/P (MAP)   Pulse Ox  O2          O2 Flow    FiO2


Time                                                 Delivery    Rate


   6/13/19  97.2     63    16      122/76       100  Room Air


     09:29                           (91)





Airway:  Adequate mouth opening, Adequate thyromental dist


Mallampati:  Mallampati II


Teeth:  Normal


Lung:  Normal


Heart:  Normal





ASA Physical Status


ASA physical status:  2


Emergency:  None





Planned Anesthetic


General/MAC:  ETT





Planned Pain Management


Parenteral pain med





Pre-operative Attestations


Prior to commencing anesthesia and surgery, the patient was re-evaluated, there 


was verification of:


*The patient's identity


*The results of appropriate recent lab work and preoperative vital signs


*The above evaluation not changing prior to induction


*Anesthetic plan, risk benefits, alternative and complications discussed with 


patient/family; questions answered; patient/family understands, accepts and 


wishes to proceed.











CHUYITA ABDI                 Jun 13, 2019 10:07

## 2019-06-13 NOTE — SIPON
Date/Time of Note


Date/Time of Note


DATE: 6/13/19 


TIME: 11:34





Operative Report


Preoperative Diagnosis


sd turb hyp


Postoperative Diagnosis


same


Operation/Procedure Performed


septo turb red


Surgeon


nonesee signature line


First assist


none


Anesthesia:  general


Estimated blood loss:  0 - 10 ml's


Transfusion Required


   none


Specimen


to path


Grafts/Implants


none


Complications


none











ALFREDO YOUNGER MD            Jun 13, 2019 11:36

## 2019-07-15 NOTE — OPR
DATE OF OPERATION:  06/13/2019

 

 

PREOPERATIVE DIAGNOSIS: Septal deviation with turbinate hypertrophy.

 

POSTOPERATIVE DIAGNOSIS:  Septal deviation with turbinate hypertrophy.

 

PROCEDURE PERFORMED:  Septoplasty with turbinate reduction.

 

OPERATION:  The patient brought to the operating room under parenteral sedation, general oral endotra
cheal anesthesia, with the patient in the supine position, sterile sheets and drapes applied.  Nose a
nesthetized topically with 5% cottonoid cocaine and Xylocaine 1% epinephrine 1:100,000 injectable.  T
he inferior turbinate bones were lightly crushed and outfractured.  A left septal hemitransfixion inc
ision was made.  Septal flaps were elevated bilaterally.  The quadrilateral cartilage was detached fr
om the crest of the premaxilla and from the bony cartilaginous junction.  Strips of cartilage were re
moved inferiorly and posteriorly and the remaining cartilage vertically through and through cut.  A v
omerine spur on the right was mobilized with mallet and chisel and removed with forceps.  The septal 
compartment was then suctioned.  The incision was closed with interrupted 4-0 chromic.  The nose was 
packed with Nasopore sponge impregnated with bacitracin ointment.  A drip pad was applied.  The patie
nt then awakened and extubated in the operating room and returned to recovery in excellent condition.


 

ESTIMATED BLOOD LOSS:  Nil.

 

COMPLICATIONS:  None.

 

 

Dictated By: ALFREDO THOMAS/NERY

DD:    07/15/2019 13:32:59

DT:    07/15/2019 13:58:49

Conf#: [id number]

DID#:  [id number]